# Patient Record
Sex: MALE | Race: WHITE | NOT HISPANIC OR LATINO | URBAN - NONMETROPOLITAN AREA
[De-identification: names, ages, dates, MRNs, and addresses within clinical notes are randomized per-mention and may not be internally consistent; named-entity substitution may affect disease eponyms.]

---

## 2021-05-11 ENCOUNTER — IMPORTED ENCOUNTER (OUTPATIENT)
Dept: URBAN - NONMETROPOLITAN AREA CLINIC 1 | Facility: CLINIC | Age: 46
End: 2021-05-11

## 2021-05-11 PROBLEM — H52.223: Noted: 2021-05-11

## 2021-05-11 PROBLEM — H52.4: Noted: 2021-05-11

## 2021-05-11 PROBLEM — H52.03: Noted: 2021-05-11

## 2021-05-11 PROCEDURE — 92004 COMPRE OPH EXAM NEW PT 1/>: CPT

## 2021-05-11 PROCEDURE — 92015 DETERMINE REFRACTIVE STATE: CPT

## 2021-05-11 NOTE — PATIENT DISCUSSION
Compound Hyperopic Astigmatism OU w/ Presbyopia-  discussed findings w/ patient -  new spectacle Rx issued today-  continue to monitor yearly or prn

## 2022-04-10 ASSESSMENT — VISUAL ACUITY
OU_SC: 20/25-2
OU_CC: J1+
OD_SC: 20/30
OS_SC: 20/25

## 2022-04-10 ASSESSMENT — TONOMETRY
OS_IOP_MMHG: 18
OD_IOP_MMHG: 18